# Patient Record
Sex: MALE | Race: WHITE | Employment: FULL TIME | URBAN - METROPOLITAN AREA
[De-identification: names, ages, dates, MRNs, and addresses within clinical notes are randomized per-mention and may not be internally consistent; named-entity substitution may affect disease eponyms.]

---

## 2023-08-10 ENCOUNTER — OFFICE VISIT (OUTPATIENT)
Dept: OCCUPATIONAL MEDICINE | Age: 33
End: 2023-08-10

## 2023-08-10 DIAGNOSIS — H10.9 CONJUNCTIVITIS OF RIGHT EYE, UNSPECIFIED CONJUNCTIVITIS TYPE: Primary | ICD-10-CM

## 2023-08-10 RX ORDER — LORATADINE 10 MG/1
10 TABLET ORAL
COMMUNITY

## 2023-08-10 RX ORDER — FLUTICASONE PROPIONATE 50 MCG
SPRAY, SUSPENSION (ML) NASAL
COMMUNITY
Start: 2021-02-02

## 2023-08-10 RX ORDER — POLYMYXIN B SULFATE AND TRIMETHOPRIM 1; 10000 MG/ML; [USP'U]/ML
1 SOLUTION OPHTHALMIC EVERY 4 HOURS
Qty: 3 ML | Refills: 0 | Status: SHIPPED | OUTPATIENT
Start: 2023-08-10 | End: 2023-08-17

## 2023-08-10 ASSESSMENT — ENCOUNTER SYMPTOMS
EYE ITCHING: 1
FOREIGN BODY SENSATION: 1
BLURRED VISION: 0
EYE DISCHARGE: 0
VOMITING: 0
NAUSEA: 0
EYE PAIN: 0
DOUBLE VISION: 0
RESPIRATORY NEGATIVE: 1
EYE REDNESS: 1
PHOTOPHOBIA: 0

## 2023-08-10 ASSESSMENT — VISUAL ACUITY: OU: 1

## 2023-08-10 NOTE — PROGRESS NOTES
PROGRESS NOTE    SUBJECTIVE:     Becca Prescott is a 27 y.o. male seen for:    Chief Complaint    Eye Problem       Eye Problem   The right eye is affected. This is a new problem. Episode onset: 3 weeks. The problem has been unchanged. There was no injury mechanism. The patient is experiencing no pain (discomfort). There is No known exposure to pink eye. He Does not wear contacts. Associated symptoms include eye redness, a foreign body sensation, itching and a recent URI. Pertinent negatives include no blurred vision, eye discharge, double vision, fever, nausea, photophobia or vomiting. Associated symptoms comments: Dry eyes- uses oil drops x 2x. He has tried eye drops for the symptoms. The treatment provided mild relief. 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 Current Outpatient Medications   Medication Sig Dispense Refill    fluticasone (FLONASE) 50 MCG/ACT nasal spray       loratadine (CLARITIN) 10 MG tablet Take 1 tablet by mouth      trimethoprim-polymyxin b (POLYTRIM) 27405-0.1 UNIT/ML-% ophthalmic solution Place 1 drop into the right eye every 4 hours for 7 days 3 mL 0     No current facility-administered medications for this visit. Allergies   Allergen Reactions    Penicillins Hives     Review of Systems   Constitutional:  Negative for fever. HENT: Negative. Eyes:  Positive for redness and itching. Negative for blurred vision, double vision, photophobia, pain, discharge and visual disturbance. Respiratory: Negative. Cardiovascular: Negative. Gastrointestinal:  Negative for nausea and vomiting. Endocrine: Negative. Genitourinary: Negative. Musculoskeletal: Negative. Skin: Negative. Allergic/Immunologic: Positive for environmental allergies. Neurological: Negative. Hematological: Negative.

## 2023-08-15 ENCOUNTER — OFFICE VISIT (OUTPATIENT)
Dept: OCCUPATIONAL MEDICINE | Age: 33
End: 2023-08-15

## 2023-08-15 DIAGNOSIS — H57.89 REDNESS OF RIGHT EYE: ICD-10-CM

## 2023-08-15 DIAGNOSIS — H57.9 SENSATION OF FOREIGN BODY IN EYE: Primary | ICD-10-CM

## 2023-08-15 ASSESSMENT — ENCOUNTER SYMPTOMS
GASTROINTESTINAL NEGATIVE: 1
BLURRED VISION: 0
EYE DISCHARGE: 0
RESPIRATORY NEGATIVE: 1
ALLERGIC/IMMUNOLOGIC NEGATIVE: 1
EYE REDNESS: 1
NAUSEA: 0
DOUBLE VISION: 0
EYE PAIN: 1
FOREIGN BODY SENSATION: 1
PHOTOPHOBIA: 0
VOMITING: 0
EYE ITCHING: 0

## 2023-08-15 ASSESSMENT — VISUAL ACUITY: OU: 1

## 2023-08-15 NOTE — PROGRESS NOTES
PROGRESS NOTE    SUBJECTIVE:     Mikey Ko is a 27 y.o. male seen for:    Chief Complaint    Eye Problem       Eye Problem   The right eye is affected. This is a new problem. Episode onset: 3 weeks. The problem has been unchanged. The injury mechanism is unknown. The pain is mild. There is No known exposure to pink eye. He Does not wear contacts. Associated symptoms include eye redness and a foreign body sensation. Pertinent negatives include no blurred vision, eye discharge, double vision, fever, itching, nausea, photophobia, recent URI or vomiting. He has tried eye drops for the symptoms. The treatment provided mild relief. 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 Patient has 2 more days left of the Polytrim. Current Outpatient Medications   Medication Sig Dispense Refill    trimethoprim-polymyxin b (POLYTRIM) 72236-4.1 UNIT/ML-% ophthalmic solution Place 1 drop into the right eye every 4 hours for 7 days 3 mL 0    fluticasone (FLONASE) 50 MCG/ACT nasal spray       loratadine (CLARITIN) 10 MG tablet Take 1 tablet by mouth       No current facility-administered medications for this visit. Allergies   Allergen Reactions    Penicillins Hives     Review of Systems   Constitutional:  Negative for fever. HENT: Negative. Eyes:  Positive for pain and redness. Negative for blurred vision, double vision, photophobia, discharge, itching and visual disturbance. Respiratory: Negative. Cardiovascular: Negative. Gastrointestinal: Negative. Negative for nausea and vomiting. Endocrine: Negative. Genitourinary: Negative. Musculoskeletal: Negative. Skin: Negative. Allergic/Immunologic: Negative. Neurological: Negative. Hematological: Negative. Psychiatric/Behavioral: Negative.        OBJECTIVE:    Physical

## 2023-08-17 ENCOUNTER — OFFICE VISIT (OUTPATIENT)
Dept: OCCUPATIONAL MEDICINE | Age: 33
End: 2023-08-17

## 2023-08-17 DIAGNOSIS — J30.2 SEASONAL ALLERGIES: ICD-10-CM

## 2023-08-17 DIAGNOSIS — Z76.0 MEDICATION REFILL: Primary | ICD-10-CM

## 2023-08-17 RX ORDER — FLUTICASONE PROPIONATE 50 MCG
SPRAY, SUSPENSION (ML) NASAL
Qty: 16 G | Refills: 0 | Status: SHIPPED | OUTPATIENT
Start: 2023-08-17

## 2023-08-17 RX ORDER — LOTEPREDNOL ETABONATE 5 MG/ML
SUSPENSION/ DROPS OPHTHALMIC
COMMUNITY
Start: 2023-08-15

## 2023-08-17 ASSESSMENT — ENCOUNTER SYMPTOMS
COUGH: 0
RHINORRHEA: 1
SINUS PAIN: 0
WHEEZING: 0
GASTROINTESTINAL NEGATIVE: 1
SINUS PRESSURE: 0
EYE ITCHING: 1
EYE REDNESS: 1
CHEST TIGHTNESS: 0
HOARSE VOICE: 0
SWOLLEN GLANDS: 0
PHOTOPHOBIA: 1
EYE PAIN: 1
SHORTNESS OF BREATH: 0
SORE THROAT: 0
EYE DISCHARGE: 0

## 2023-08-17 ASSESSMENT — VISUAL ACUITY: OU: 1

## 2023-08-17 NOTE — PROGRESS NOTES
PROGRESS NOTE    SUBJECTIVE:     Annalisa Lawton is a 27 y.o. male seen for:    Chief Complaint    Allergies       Allergies  Presents for initial visit. He complains of congestion, eye itching, headaches and rhinorrhea. He reports no cough, ear pain, fatigue, fever, hoarse voice, itchy nose, plugged ear sensation, rash, sinus pressure, sneezing, sore throat, swollen glands or wheezing. Symptom severity has been moderate. 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 Past treatments include eye drops, antihistamines and nasal steroids. The treatment provided moderate relief. His past medical history is significant for allergies. There is no history of asthma, atopic dermatitis, bronchitis, nasal polyps or sinus disease. He has had no previous allergy testing. Patient states he saw the eye doctor at [facility] on Tuesday and was advised to continue Polytrim Drops for a little while longer and add Lotemax Drops. He has not picked up the Lotemax Drops yet. The eye doctor believes his eye redness is related to allergies according to patient. Patient was using Flonase in the past for his seasonal allergies, but ran out. Patient requests refill of Flonase. Current Outpatient Medications   Medication Sig Dispense Refill    fluticasone (FLONASE) 50 MCG/ACT nasal spray 2 sprays each nostril once daily 16 g 0    trimethoprim-polymyxin b (POLYTRIM) 83441-1.1 UNIT/ML-% ophthalmic solution Place 1 drop into the right eye every 4 hours for 7 days 3 mL 0    loteprednol (LOTEMAX) 0.5 % ophthalmic suspension        No current facility-administered medications for this visit. Allergies   Allergen Reactions    Penicillins Hives     Review of Systems   Constitutional:  Negative for fatigue and fever. HENT:  Positive for congestion, postnasal drip and rhinorrhea. Negative for ear discharge, ear pain, hoarse voice, sinus pressure, sinus pain, sneezing and sore throat.     Eyes:  Positive

## 2023-09-26 ENCOUNTER — OFFICE VISIT (OUTPATIENT)
Dept: OCCUPATIONAL MEDICINE | Age: 33
End: 2023-09-26

## 2023-09-26 DIAGNOSIS — Z76.0 MEDICATION REFILL: Primary | ICD-10-CM

## 2023-09-26 DIAGNOSIS — B35.6 TINEA CRURIS: ICD-10-CM

## 2023-09-26 ASSESSMENT — ENCOUNTER SYMPTOMS
RESPIRATORY NEGATIVE: 1
GASTROINTESTINAL NEGATIVE: 1
ALLERGIC/IMMUNOLOGIC NEGATIVE: 1
EYES NEGATIVE: 1

## 2023-09-26 NOTE — PROGRESS NOTES
PROGRESS NOTE    SUBJECTIVE:     Mary Worthy is a 27 y.o. male seen for:    Chief Complaint    Medication Refill       Patient requests refill of nystatin-triamcinolone cream. He has been using this cream as needed since 2019 for \"jock itch\" on his buttocks and groin. He applies the cream twice daily for about 3 days and the rash subsides. These flares typically occur once per month. Patient works outside in a [employer] and stays hot and sweaty often. The current flare he is experiencing started about 1 week ago. He tolerates this medication well without any questions or concerns. Current Outpatient Medications   Medication Sig Dispense Refill    nystatin-triamcinolone (MYCOLOG II) 902366-7.1 UNIT/GM-% cream Apply topically to affected areas twice daily for 3 to 5 days as needed for jock itch flare. 15 g 0     No current facility-administered medications for this visit. Allergies   Allergen Reactions    Penicillins Other (See Comments)     Age 26: \"felt funny\" after taking PCN. Patient believes this may have occurred because he took it on a empty stomach. He stopped the PCN and was switched to another Abx at the time. Review of Systems   Constitutional: Negative. HENT: Negative. Eyes: Negative. Respiratory: Negative. Cardiovascular: Negative. Gastrointestinal: Negative. Endocrine: Negative. Genitourinary: Negative. Musculoskeletal: Negative. Skin:  Positive for rash. Allergic/Immunologic: Negative. Neurological: Negative. Hematological: Negative. Psychiatric/Behavioral: Negative. OBJECTIVE:    Physical Exam  Constitutional:       General: He is not in acute distress. Appearance: Normal appearance. He is not ill-appearing, toxic-appearing or diaphoretic. Pulmonary:      Effort: Pulmonary effort is normal.   Genitourinary:     Comments: Deferred. No chaperone present. Skin:     General: Skin is warm and dry.    Neurological:      Mental Status: He

## 2024-04-18 ENCOUNTER — OFFICE VISIT (OUTPATIENT)
Age: 34
End: 2024-04-18

## 2024-04-18 DIAGNOSIS — Z76.0 MEDICATION REFILL: Primary | ICD-10-CM

## 2024-04-18 DIAGNOSIS — B35.6 TINEA CRURIS: ICD-10-CM

## 2024-04-18 RX ORDER — CLOTRIMAZOLE 1 %
CREAM (GRAM) TOPICAL
Qty: 28 G | Refills: 0 | Status: SHIPPED | OUTPATIENT
Start: 2024-04-18 | End: 2024-04-25

## 2024-04-18 NOTE — PROGRESS NOTES
PROGRESS NOTE    SUBJECTIVE:     Alec Booker is a 33 y.o. male seen for:    Chief Complaint    Medication Refill       Patient requests refill of nystatin-triamcinolone cream for jock itch. He usually only applies it for 2-3 days and then his symptoms go away for a while. Patient tolerates this medication well without any questions or concerns.     Current Outpatient Medications   Medication Sig Dispense Refill    nystatin-triamcinolone (MYCOLOG II) 595462-1.1 UNIT/GM-% cream Apply topically to affected areas twice daily for 3 to 5 days as needed for jock itch flare. 30 g 0    clotrimazole (LOTRIMIN AF) 1 % cream Apply topically 2 times daily to affected areas (usually for 1 to 2 weeks; but no more than 3 weeks max) 28 g 0     No current facility-administered medications for this visit.      Allergies   Allergen Reactions    Penicillins Other (See Comments)     Age 30: \"felt funny\" after taking PCN. Patient believes this may have occurred because he took it on a empty stomach. He stopped the PCN and was switched to another Abx at the time.     Review of Systems   Skin:  Positive for rash.   All other systems reviewed and are negative.    OBJECTIVE:    Physical Exam  Constitutional:       General: He is not in acute distress.     Appearance: Normal appearance. He is not ill-appearing, toxic-appearing or diaphoretic.   Pulmonary:      Effort: Pulmonary effort is normal.   Genitourinary:     Comments: Deferred  Skin:     General: Skin is warm and dry.   Neurological:      General: No focal deficit present.      Mental Status: He is alert and oriented to person, place, and time.   Psychiatric:         Mood and Affect: Mood normal.         Behavior: Behavior normal.     ASSESSMENT and PLAN    Alec was seen today for medication refill.    Diagnoses and all orders for this visit:    Medication refill  -     nystatin-triamcinolone (MYCOLOG II) 578604-7.1 UNIT/GM-% cream; Apply topically to affected areas twice daily

## 2024-06-04 ENCOUNTER — OFFICE VISIT (OUTPATIENT)
Age: 34
End: 2024-06-04

## 2024-06-04 VITALS
TEMPERATURE: 98.7 F | DIASTOLIC BLOOD PRESSURE: 84 MMHG | OXYGEN SATURATION: 97 % | SYSTOLIC BLOOD PRESSURE: 128 MMHG | HEART RATE: 79 BPM | RESPIRATION RATE: 16 BRPM

## 2024-06-04 DIAGNOSIS — K12.2 ORAL ABSCESS: Primary | ICD-10-CM

## 2024-06-04 RX ORDER — CLINDAMYCIN HYDROCHLORIDE 150 MG/1
450 CAPSULE ORAL 3 TIMES DAILY
Qty: 63 CAPSULE | Refills: 0 | Status: SHIPPED | OUTPATIENT
Start: 2024-06-04 | End: 2024-06-11

## 2024-06-04 ASSESSMENT — ENCOUNTER SYMPTOMS
SINUS PRESSURE: 0
TROUBLE SWALLOWING: 0
FACIAL SWELLING: 0
SORE THROAT: 0
SINUS PAIN: 0

## 2024-06-04 NOTE — PROGRESS NOTES
PROGRESS NOTE    SUBJECTIVE:   Alec Booker is a 33 y.o. male seen for ____.    Chief Complaint    Oral Swelling         Pt presents to the wellness clinic with c/o of a dental concern that began several weeks ago. He has had on and off pain and swelling in his upper right oral cavity for about six weeks and this morning he noticed an ulcer near the tooth that has been giving him these issues. Pt states he went to the the ER  and was hospitalized for a few days about six weeks ago due to fever (of 103 at that time), disorientation, and intense dental/jaw/facial pain and swelling. Pt states they never found the source of his symptoms, but his blood work and imaging were negative while he was hospitalized. He states they did not do any imaging beyond a chest xray to his knowledge, and they did not prescribe him any medications to take at home. I am unable to see his records from that hospital visit at this time. He began to have pain and discomfort in the same tooth and jaw area shortly after being discharged, so he began taking his child's amoxicillin (with \"something else\" in it) that was at the house. He reports taking the antibiotic for 10 days and followed up with the NP in this clinic. The NP advised him to go to a dentist for follow up and provided him a number to one who is local, but patient has not been able to get in to see a dentist due to his strenuous work schedule.   Pt denies fever, drainage, severe pain, facial swelling or other worrisome systemic symptoms at this time, but he does endorse a discomfort that he describes as a \"numbness\" to the ulcerated area and tooth. He cannot use his electric toothbrush at this time due to his symptoms. Pt states he is established with a local dentist and had extensive work done earlier this year.      Dental Pain   This is a new problem. The current episode started more than 1 month ago. The problem has been waxing and waning. The pain is mild (more discomfort or

## 2024-12-10 ENCOUNTER — OFFICE VISIT (OUTPATIENT)
Age: 34
End: 2024-12-10

## 2024-12-10 VITALS
DIASTOLIC BLOOD PRESSURE: 82 MMHG | OXYGEN SATURATION: 96 % | SYSTOLIC BLOOD PRESSURE: 126 MMHG | HEIGHT: 69 IN | RESPIRATION RATE: 14 BRPM | HEART RATE: 72 BPM | BODY MASS INDEX: 26.84 KG/M2 | WEIGHT: 181.2 LBS

## 2024-12-10 DIAGNOSIS — Z00.00 WELLNESS EXAMINATION: Primary | ICD-10-CM

## 2024-12-10 NOTE — PROGRESS NOTES
[facility]   [facility]- Onsite Clinic        Date of Visit: 12/10/24       SUBJECTIVE:   Alec Booker is a 34 y.o. male presenting to the onsite Wellness Center at his place of employment, [employer], for a basic physical required by the company to receive a yearly bonus.             HPI  He does not have any concerns or complaints at this time. He does not currently have a primary care provider.       Current Outpatient Medications   Medication Sig Dispense Refill   • nystatin-triamcinolone (MYCOLOG II) 200629-4.1 UNIT/GM-% cream Apply topically to affected areas twice daily for 3 to 5 days as needed for jock itch flare. 30 g 0     No current facility-administered medications for this visit.      Allergies   Allergen Reactions   • Penicillins Other (See Comments)     Age 30: \"felt funny\" after taking PCN. Patient believes this may have occurred because he took it on a empty stomach. He stopped the PCN and was switched to another Abx at the time.             Review of Systems   Constitutional: Negative.    Psychiatric/Behavioral: Negative.          OBJECTIVE:  /82   Pulse 72   Resp 14   Ht 1.753 m (5' 9\")   Wt 82.2 kg (181 lb 3.2 oz)   SpO2 96%   BMI 26.76 kg/m²      No results found for this visit on 12/10/24.    Physical Exam  Vitals reviewed.   Constitutional:       Appearance: Normal appearance.   Cardiovascular:      Rate and Rhythm: Normal rate and regular rhythm.      Pulses: Normal pulses.      Heart sounds: Normal heart sounds.   Pulmonary:      Effort: Pulmonary effort is normal.      Breath sounds: Normal breath sounds.   Neurological:      General: No focal deficit present.      Mental Status: He is alert and oriented to person, place, and time.   Psychiatric:         Mood and Affect: Mood normal.         Behavior: Behavior normal.     ASSESSMENT and PLAN    There are no diagnoses linked to this encounter.    Periodic health assessment, general screening,

## 2025-06-24 ENCOUNTER — OFFICE VISIT (OUTPATIENT)
Age: 35
End: 2025-06-24

## 2025-06-24 DIAGNOSIS — Z76.0 MEDICATION REFILL: ICD-10-CM

## 2025-06-24 DIAGNOSIS — B35.6 TINEA CRURIS: ICD-10-CM

## 2025-06-24 RX ORDER — NYSTATIN AND TRIAMCINOLONE ACETONIDE 100000; 1 [USP'U]/G; MG/G
CREAM TOPICAL
Qty: 30 G | Refills: 0 | Status: SHIPPED | OUTPATIENT
Start: 2025-06-24

## 2025-06-24 RX ORDER — NYSTATIN AND TRIAMCINOLONE ACETONIDE 100000; 1 [USP'U]/G; MG/G
CREAM TOPICAL
Qty: 30 G | Refills: 0 | Status: SHIPPED | OUTPATIENT
Start: 2025-06-24 | End: 2025-06-24

## 2025-06-24 NOTE — PATIENT INSTRUCTIONS
Management:  Wash the rash with soap and water. Pat the skin dry.  Put a cool compress on the skin to relieve itching.  To avoid spreading it, wash your hands well after treating or touching the rash.   Try not to scratch the rash.  Shower or bathe daily and after you exercise.  Keep your skin dry as much as possible to allow it to heal.  Until your jock itch is cured, wear loose-fitting clothing. Avoid tight underwear, pants, and tights.  Wash your exercise clothes after every wearing.  Do not share clothing, sports equipment, towels, or sheets to avoid spreading the fungi to other people.    Prevention:  Wash your exercise clothes, underwear, socks, and towels after each use.  Wear shower shoes when you use public showers and locker rooms.  If you have athlete's foot, treat it. During treatment, put your socks on before you put on your underwear. This can prevent the spread of the fungus from your feet to your groin.  If you keep getting athlete's foot, dry your feet last when you towel off after a shower or bath. This can help prevent spreading infection from your feet to your groin.

## 2025-06-24 NOTE — PROGRESS NOTES
Try not to scratch the rash.  Shower or bathe daily and after you exercise.  Keep your skin dry as much as possible to allow it to heal.  Until your jock itch is cured, wear loose-fitting clothing. Avoid tight underwear, pants, and tights.  Wash your exercise clothes after every wearing.  Do not share clothing, sports equipment, towels, or sheets to avoid spreading the fungi to other people.    Prevention:  Wash your exercise clothes, underwear, socks, and towels after each use.  Wear shower shoes when you use public showers and locker rooms.  If you have athlete's foot, treat it. During treatment, put your socks on before you put on your underwear. This can prevent the spread of the fungus from your feet to your groin.  If you keep getting athlete's foot, dry your feet last when you towel off after a shower or bath. This can help prevent spreading infection from your feet to your groin.   Return if symptoms worsen or fail to improve.    I have reviewed Alec's allergies, medication list, and past medical history and updated the patient record appropriately.  FLORINDA Vela - NP